# Patient Record
Sex: MALE | Race: WHITE | NOT HISPANIC OR LATINO | Employment: OTHER | URBAN - METROPOLITAN AREA
[De-identification: names, ages, dates, MRNs, and addresses within clinical notes are randomized per-mention and may not be internally consistent; named-entity substitution may affect disease eponyms.]

---

## 2017-07-24 ENCOUNTER — CLINICAL SUPPORT (OUTPATIENT)
Dept: OCCUPATIONAL MEDICINE | Facility: CLINIC | Age: 67
End: 2017-07-24

## 2017-07-24 DIAGNOSIS — Z02.83 ENCOUNTER FOR DRUG SCREENING: Primary | ICD-10-CM

## 2017-07-24 PROCEDURE — 80305 DRUG TEST PRSMV DIR OPT OBS: CPT | Mod: S$GLB,,, | Performed by: PREVENTIVE MEDICINE

## 2017-08-01 ENCOUNTER — CLINICAL SUPPORT (OUTPATIENT)
Dept: OCCUPATIONAL MEDICINE | Facility: CLINIC | Age: 67
End: 2017-08-01

## 2017-08-01 DIAGNOSIS — Z02.83 ENCOUNTER FOR DRUG SCREENING: Primary | ICD-10-CM

## 2017-08-01 PROCEDURE — 80305 DRUG TEST PRSMV DIR OPT OBS: CPT | Mod: S$GLB,,, | Performed by: INTERNAL MEDICINE

## 2019-12-22 ENCOUNTER — OFFICE VISIT (OUTPATIENT)
Dept: URGENT CARE | Facility: CLINIC | Age: 69
End: 2019-12-22
Payer: MEDICARE

## 2019-12-22 VITALS
RESPIRATION RATE: 18 BRPM | HEART RATE: 87 BPM | WEIGHT: 199 LBS | OXYGEN SATURATION: 98 % | BODY MASS INDEX: 30.16 KG/M2 | TEMPERATURE: 100 F | DIASTOLIC BLOOD PRESSURE: 98 MMHG | HEIGHT: 68 IN | SYSTOLIC BLOOD PRESSURE: 166 MMHG

## 2019-12-22 DIAGNOSIS — J02.9 SORE THROAT: Primary | ICD-10-CM

## 2019-12-22 DIAGNOSIS — J06.9 UPPER RESPIRATORY TRACT INFECTION, UNSPECIFIED TYPE: ICD-10-CM

## 2019-12-22 LAB
CTP QC/QA: YES
FLUAV AG NPH QL: NEGATIVE
FLUBV AG NPH QL: NEGATIVE

## 2019-12-22 PROCEDURE — 99204 OFFICE O/P NEW MOD 45 MIN: CPT | Mod: S$GLB,,, | Performed by: NURSE PRACTITIONER

## 2019-12-22 PROCEDURE — 99204 PR OFFICE/OUTPT VISIT, NEW, LEVL IV, 45-59 MIN: ICD-10-PCS | Mod: S$GLB,,, | Performed by: NURSE PRACTITIONER

## 2019-12-22 PROCEDURE — 87804 INFLUENZA ASSAY W/OPTIC: CPT | Mod: QW,S$GLB,, | Performed by: NURSE PRACTITIONER

## 2019-12-22 PROCEDURE — 87804 POCT INFLUENZA A/B: ICD-10-PCS | Mod: QW,S$GLB,, | Performed by: NURSE PRACTITIONER

## 2019-12-22 RX ORDER — PROMETHAZINE HYDROCHLORIDE AND DEXTROMETHORPHAN HYDROBROMIDE 6.25; 15 MG/5ML; MG/5ML
SYRUP ORAL
COMMUNITY
Start: 2019-12-20

## 2019-12-22 NOTE — PATIENT INSTRUCTIONS
"                                                         URI   If your condition worsens or fails to improve we recommend that you receive another evaluation at the ER immediately or contact your PCP to discuss your concerns or return here. You must understand that you've received an urgent care treatment only and that you may be released before all your medical problems are known or treated. You the patient will arrange for follouwp care as instructed.   If we discussed that I think your illness is viral, it will not respond to antibiotics and will last 5-7 days. If we discussed "wait and see" antibiotics and if over the next few days the symptoms worsen start the antibiotics I have given you.   -  If you are female and on BCP and do take the antibiotics, use additional methods to prevent pregnancy while on the antibiotics and for one cycle after.   -  Flonase (fluticasone) is a nasal spray which is available over the counter and may help with your symptoms.   -  Zyrtec D, Claritin D or Allegra D can also help with symptoms of congestion and drainage.   -  If you have hypertension avoid using the "D" which is the decongestant.  Instead you can use Coricidin HBP for cold and cough symptoms.    -  If you just have drainage you can take plain Zyrtec, Claritin or Allegra   -  If you just have a congested feeling you can take pseudoephedrine (unless you have high blood pressure) which you have to sign for behind the counter. Do not buy the phenylephrine which is on the shelf as it is not effective   -  Rest and fluids are also important.   -  Tylenol or ibuprofen can also be used as directed for pain unless you have an allergy to them or medical condition such as stomach ulcers, kidney or liver disease or blood thinners etc for which you should not be taking these type of medications.   -  If you are flying in the next few days Afrin nose drops for the airplane flight upon take off and landing may help. Other than at " those times refrain from using afrin.   - If you were prescribed a narcotic do not drive or operate heavy machinery while taking these medications.

## 2024-03-25 ENCOUNTER — HOSPITAL ENCOUNTER (EMERGENCY)
Facility: HOSPITAL | Age: 74
Discharge: LEFT BEFORE DISCHARGE | End: 2024-03-25
Attending: EMERGENCY MEDICINE
Payer: MEDICARE

## 2024-03-25 VITALS
HEART RATE: 58 BPM | BODY MASS INDEX: 27.28 KG/M2 | WEIGHT: 180 LBS | RESPIRATION RATE: 20 BRPM | HEIGHT: 68 IN | TEMPERATURE: 98 F | DIASTOLIC BLOOD PRESSURE: 90 MMHG | SYSTOLIC BLOOD PRESSURE: 195 MMHG | OXYGEN SATURATION: 96 %

## 2024-03-25 DIAGNOSIS — G45.9 TIA (TRANSIENT ISCHEMIC ATTACK): Primary | ICD-10-CM

## 2024-03-25 DIAGNOSIS — I10 HYPERTENSION, UNSPECIFIED TYPE: ICD-10-CM

## 2024-03-25 LAB
ALBUMIN SERPL BCP-MCNC: 4.3 G/DL (ref 3.5–5.2)
ALP SERPL-CCNC: 52 U/L (ref 55–135)
ALT SERPL W/O P-5'-P-CCNC: 22 U/L (ref 10–44)
ANION GAP SERPL CALC-SCNC: 6 MMOL/L (ref 8–16)
AST SERPL-CCNC: 25 U/L (ref 10–40)
BASOPHILS # BLD AUTO: 0.02 K/UL (ref 0–0.2)
BASOPHILS NFR BLD: 0.3 % (ref 0–1.9)
BILIRUB SERPL-MCNC: 0.6 MG/DL (ref 0.1–1)
BUN SERPL-MCNC: 7 MG/DL (ref 8–23)
CALCIUM SERPL-MCNC: 10.1 MG/DL (ref 8.7–10.5)
CHLORIDE SERPL-SCNC: 106 MMOL/L (ref 95–110)
CO2 SERPL-SCNC: 26 MMOL/L (ref 23–29)
CREAT SERPL-MCNC: 0.9 MG/DL (ref 0.5–1.4)
DIFFERENTIAL METHOD BLD: ABNORMAL
EOSINOPHIL # BLD AUTO: 0.2 K/UL (ref 0–0.5)
EOSINOPHIL NFR BLD: 2.9 % (ref 0–8)
ERYTHROCYTE [DISTWIDTH] IN BLOOD BY AUTOMATED COUNT: 12.8 % (ref 11.5–14.5)
EST. GFR  (NO RACE VARIABLE): >60 ML/MIN/1.73 M^2
GLUCOSE SERPL-MCNC: 94 MG/DL (ref 70–110)
HCT VFR BLD AUTO: 45.7 % (ref 40–54)
HCV AB SERPL QL IA: NORMAL
HGB BLD-MCNC: 15.8 G/DL (ref 14–18)
HIV 1+2 AB+HIV1 P24 AG SERPL QL IA: NORMAL
IMM GRANULOCYTES # BLD AUTO: 0.02 K/UL (ref 0–0.04)
IMM GRANULOCYTES NFR BLD AUTO: 0.3 % (ref 0–0.5)
LYMPHOCYTES # BLD AUTO: 1.8 K/UL (ref 1–4.8)
LYMPHOCYTES NFR BLD: 28.4 % (ref 18–48)
MAGNESIUM SERPL-MCNC: 2.1 MG/DL (ref 1.6–2.6)
MCH RBC QN AUTO: 31.6 PG (ref 27–31)
MCHC RBC AUTO-ENTMCNC: 34.6 G/DL (ref 32–36)
MCV RBC AUTO: 91 FL (ref 82–98)
MONOCYTES # BLD AUTO: 0.4 K/UL (ref 0.3–1)
MONOCYTES NFR BLD: 7 % (ref 4–15)
NEUTROPHILS # BLD AUTO: 3.8 K/UL (ref 1.8–7.7)
NEUTROPHILS NFR BLD: 61.1 % (ref 38–73)
NRBC BLD-RTO: 0 /100 WBC
OHS QRS DURATION: 74 MS
OHS QTC CALCULATION: 402 MS
PHOSPHATE SERPL-MCNC: 2.5 MG/DL (ref 2.7–4.5)
PLATELET # BLD AUTO: 196 K/UL (ref 150–450)
PMV BLD AUTO: 10.8 FL (ref 9.2–12.9)
POTASSIUM SERPL-SCNC: 4 MMOL/L (ref 3.5–5.1)
PROT SERPL-MCNC: 7.5 G/DL (ref 6–8.4)
RBC # BLD AUTO: 5 M/UL (ref 4.6–6.2)
SODIUM SERPL-SCNC: 138 MMOL/L (ref 136–145)
WBC # BLD AUTO: 6.17 K/UL (ref 3.9–12.7)

## 2024-03-25 PROCEDURE — 86803 HEPATITIS C AB TEST: CPT | Performed by: PHYSICIAN ASSISTANT

## 2024-03-25 PROCEDURE — 93005 ELECTROCARDIOGRAM TRACING: CPT

## 2024-03-25 PROCEDURE — 84100 ASSAY OF PHOSPHORUS: CPT | Performed by: EMERGENCY MEDICINE

## 2024-03-25 PROCEDURE — 85025 COMPLETE CBC W/AUTO DIFF WBC: CPT | Performed by: STUDENT IN AN ORGANIZED HEALTH CARE EDUCATION/TRAINING PROGRAM

## 2024-03-25 PROCEDURE — 87389 HIV-1 AG W/HIV-1&-2 AB AG IA: CPT | Performed by: PHYSICIAN ASSISTANT

## 2024-03-25 PROCEDURE — 83735 ASSAY OF MAGNESIUM: CPT | Performed by: EMERGENCY MEDICINE

## 2024-03-25 PROCEDURE — 99284 EMERGENCY DEPT VISIT MOD MDM: CPT | Mod: 25

## 2024-03-25 PROCEDURE — 80053 COMPREHEN METABOLIC PANEL: CPT | Performed by: STUDENT IN AN ORGANIZED HEALTH CARE EDUCATION/TRAINING PROGRAM

## 2024-03-25 PROCEDURE — 93010 ELECTROCARDIOGRAM REPORT: CPT | Mod: ,,, | Performed by: INTERNAL MEDICINE

## 2024-03-25 RX ORDER — AMLODIPINE BESYLATE 5 MG/1
5 TABLET ORAL DAILY
Qty: 30 TABLET | Refills: 11 | Status: SHIPPED | OUTPATIENT
Start: 2024-03-25 | End: 2024-04-11

## 2024-03-25 RX ORDER — AMLODIPINE BESYLATE 5 MG/1
5 TABLET ORAL DAILY
Qty: 30 TABLET | Refills: 11 | OUTPATIENT
Start: 2024-03-25 | End: 2024-03-25

## 2024-03-25 RX ORDER — AMLODIPINE BESYLATE 5 MG/1
5 TABLET ORAL DAILY
Qty: 30 TABLET | Refills: 0 | OUTPATIENT
Start: 2024-03-25 | End: 2024-03-25

## 2024-03-25 RX ORDER — DORZOLAMIDE HYDROCHLORIDE AND TIMOLOL MALEATE 20; 5 MG/ML; MG/ML
1 SOLUTION/ DROPS OPHTHALMIC 2 TIMES DAILY
COMMUNITY

## 2024-03-25 NOTE — PROVIDER PROGRESS NOTES - EMERGENCY DEPT.
Encounter Date: 3/25/2024    ED Physician Progress Notes        Signout Note  I received signout from the previous providers.     Chief complaint:  Multiple complaints (Last wed r leg and r arm reji numb lasted 4-5 min, resolved, told by retina spec to come to er to r/o stroke,  flashing light to r eye,  hx detatched retina )      Per sign out and chart review: Manolo Mcnally is a 73 y.o. male with hx of remote bilateral retinal detachments presenting to ED at recommendation of retina specialist due to transient symptoms of poor proprioception on 3/20. Patient also examined by specialist this morning and was cleared from that perspective. Due to TIA, basic labs and MRI brain ordered. Patient also found to be bradycardia and hypertensive.     Pt signed out to me pending: MRI completion with anticipation for discharge home.     Update/ Disposition: patient declined to wait for the MRI to be performed. Patient was educated on the risks of leaving AMA without completion of workup.  Patient displayed good understanding and capacity and still opting to leave against medical advice.  Patient was instructed to follow up with vascular neurology for formal study with MRI.  Patient agreed to follow up and was given strict return precautions.  Vital signs were stable on re-evaluation.    Patient, caregiver and/or family understands the plan and verbalized agreement. All questions answered.     Diagnostic Impression:    1. TIA (transient ischemic attack)

## 2024-03-25 NOTE — DISCHARGE INSTRUCTIONS
Please follow up with your PCP shortly after discharge.    If symptoms return or worsen, or you develop difficulty walking/talking, numbness, weakness, tingling, chest pain, shortness of breath, or changes in vision, please return to the emergency department. V please also follow up with vascular neurology further management.  Referral has been placed you can reach them at: [phone]        [id number]

## 2024-03-25 NOTE — ED NOTES
..Patient identifiers for Manolo Mcnally 73 y.o. male checked and correct.  Chief Complaint   Patient presents with    Multiple complaints     Last wed r leg and r arm reji numb lasted 4-5 min, resolved, told by retina spec to come to er to r/o stroke,  flashing light to r eye,  hx detatched retina      No past medical history on file.  Allergies reported:   Review of patient's allergies indicates:   Allergen Reactions    Pred forte [prednisolone acetate]          LOC: Patient is awake, alert, and aware of environment with an appropriate affect. Patient is oriented x 3 and speaking appropriately.  APPEARANCE: Patient resting comfortably and in no acute distress. Patient is clean and well groomed, patient's clothing is properly fastened.  HEENT: **AAO--Seen by eye doctor today and told today by eye doctor to come to ED . On 3/2024 while walking around the house experienced numbness in right arm and leg that lasted less than 5 mins. And subsided  SKIN: The skin is warm and dry. Patient has normal skin turgor and moist mucus membranes. Skin is intact; no bruising or breakdown noted.  MUSKULOSKELETAL: Patient is moving all extremities well, no obvious deformities noted. Pulses intact.   RESPIRATORY: Airway is open and patent. Respirations are spontaneous and non-labored with normal effort and rate, BBS=clear  CARDIAC: Patient has a normal rate and rhythm. Sinus diane  on cardiac monitor,No peripheral edema noted. Denies any chest pain  ABDOMEN: No distention noted. Bowel sounds active in all 4 quadrants. Soft and non-tender upon palpation.  NEUROLOGICAL: . Facial expression is symmetrical. Hand grasps are equal bilaterally. Normal sensation in all extremities when touched with finger.

## 2024-03-25 NOTE — ED PROVIDER NOTES
Encounter Date: 3/25/2024       History     Chief Complaint   Patient presents with    Multiple complaints     Last wed r leg and r arm reji numb lasted 4-5 min, resolved, told by retina spec to come to er to r/o stroke,  flashing light to r eye,  hx detatched retina      HPI    Patient is a 74yo male with hx of remote bilateral retinal detachments presenting to ED at recommendation of retina specialist due to transient symptoms of poor proprioception on 3/20. Patient states that at that time, he felt as if his R hip was not working well with his R foot. During that same period of time, he had some challenges with proprioception with his R hand, having to concentrate more to grab a glass. Symptoms resolved after 4 minutes.    Also noting flashes in his R eye, similar in appearance to when he had a retinal detachment several years ago, mainly at night since the episode. He was evaluated in the retina clinic today prior to being sent over.     Review of patient's allergies indicates:   Allergen Reactions    Pred forte [prednisolone acetate]      No past medical history on file.  No past surgical history on file.  No family history on file.  Social History     Tobacco Use    Smoking status: Never     Physical Exam     Initial Vitals [03/25/24 1230]   BP Pulse Resp Temp SpO2   (!) 162/90 67 18 97.5 °F (36.4 °C) 96 %      MAP       --         Physical Exam    Constitutional: He appears well-developed and well-nourished. He is not diaphoretic. No distress.   HENT:   Head: Normocephalic and atraumatic.   Eyes: EOM are normal.   Pupils enlarged bilaterally   Cardiovascular:  Regular rhythm.           bradycardia   Pulmonary/Chest: Breath sounds normal. No respiratory distress.     Neurological: He is alert. He has normal strength. No cranial nerve deficit or sensory deficit.   Skin: Skin is warm and dry.   Psychiatric: He has a normal mood and affect. Thought content normal.         ED Course   Procedures  Labs Reviewed    CBC W/ AUTO DIFFERENTIAL - Abnormal; Notable for the following components:       Result Value    MCH 31.6 (*)     All other components within normal limits    Narrative:     Release to patient->Immediate   COMPREHENSIVE METABOLIC PANEL - Abnormal; Notable for the following components:    BUN 7 (*)     Alkaline Phosphatase 52 (*)     Anion Gap 6 (*)     All other components within normal limits    Narrative:     Release to patient->Immediate   PHOSPHORUS - Abnormal; Notable for the following components:    Phosphorus 2.5 (*)     All other components within normal limits   HIV 1 / 2 ANTIBODY    Narrative:     Release to patient->Immediate   HEPATITIS C ANTIBODY    Narrative:     Release to patient->Immediate   MAGNESIUM     EKG Readings: (Independently Interpreted)   Initial Reading: No STEMI. Rhythm: Sinus Bradycardia. Heart Rate: 50. Ectopy: No Ectopy. Conduction: Normal. T Waves Flipped: AVR. Axis: Normal. Clinical Impression: Sinus Bradycardia     ECG Results              EKG 12-lead (Final result)        Collection Time Result Time QRS Duration OHS QTC Calculation    03/25/24 13:41:41 03/25/24 13:46:21 74 402                     Final result by Interface, Lab In [facility] (03/25/24 13:46:30)                   Narrative:    Test Reason : R20.0,R20.2,    Vent. Rate : 050 BPM     Atrial Rate : 050 BPM     P-R Int : 156 ms          QRS Dur : 074 ms      QT Int : 442 ms       P-R-T Axes : 037 051 024 degrees     QTc Int : 402 ms    Sinus bradycardia  Otherwise normal ECG  No previous ECGs available  Confirmed by Michelle Asencio MD ([id number]) on 3/25/2024 1:46:17 PM    Referred By: [email or web address]   SELF           Confirmed By:Michelle Asencio MD                                  Imaging Results    None          Medications - No data to display  Medical Decision Making  Amount and/or Complexity of Data Reviewed  Labs: ordered.  Radiology: ordered.  ECG/medicine tests: ordered and independent interpretation  performed.    Risk  Decision regarding hospitalization.    Patient is a 74yo male with hx of remote bilateral retinal detachments presenting to ED at recommendation of retina specialist due to transient symptoms of poor proprioception on 3/20. Patient also examined by specialist this morning and was cleared from that perspective. Due to TIA, basic labs and MRI brain ordered. Patient also found to be bradycardia and hypertensive. Labs relatively unremarkable. He was signed out to oncoming team pending MRI completion with anticipation for discharge home.           Attending Attestation:   Physician Attestation Statement for Resident:  As the supervising MD   Physician Attestation Statement: I have personally seen and examined this patient.   I agree with the above history.  -:  Right-sided weakness that has resolved last week   As the supervising MD I agree with the above PE.     As the supervising MD I agree with the above treatment, course, plan, and disposition.   -:  Patient signed out to Dr. Michael  awaiting MRI.  Patient refused MRI and signed out against medical advice                                          Clinical Impression:  Final diagnoses:  [G45.9] TIA (transient ischemic attack) (Primary)  [I10] Hypertension, unspecified type          ED Disposition Condition    AMA Stable                Valentina Fernandez,   Resident  03/25/24 4205       Marquez Ortega III, MD  03/26/24 1182

## 2024-03-25 NOTE — FIRST PROVIDER EVALUATION
"Medical screening exam completed.  I have conducted a focused provider triage encounter, findings are as follows:    Brief history of present illness: Here for numbness that has now resolved. He reports that last Wednesday he developed numbness in the right leg and right arm that lasted for 5 minutes before spontaneously resolving. Also reports he has hx of bilateral retinal detachments and has been having flashes of light in his right peripheral vision that has been ongoing for several months. He saw his retinal specialist today who told him to come to the ED for the numbness he experienced a few days ago. He has not had any recurrent numbness. He reports his vision is normal at this time.     Vitals:    03/25/24 1230   BP: (!) 162/90   Pulse: 67   Resp: 18   Temp: 97.5 °F (36.4 °C)   TempSrc: Oral   SpO2: 96%   Weight: 81.6 kg (180 lb)   Height: 5' 8" (1.727 m)       Pertinent physical exam:    Neurological: Awake and alert, 5/5 motor in all extremities, SILT in all extremities, CN II-VII grossly intact  Constitutional: No acute distress  Respiratory: Non-labored  Cardiovascular: Well perfused     Brief workup plan:  Labs, EKG    Preliminary workup initiated; this workup will be continued and followed by the physician or advanced practice provider that is assigned to the patient when roomed.   "

## 2024-03-26 ENCOUNTER — TELEPHONE (OUTPATIENT)
Dept: INTERNAL MEDICINE | Facility: CLINIC | Age: 74
End: 2024-03-26
Payer: MEDICARE

## 2024-03-26 NOTE — TELEPHONE ENCOUNTER
----- Message from Baileydread Anne sent at 3/26/2024 11:32 AM CDT -----  Regarding: P/t advice  Type: Patient Call Back    Who called: Louise    What is the request in detail: p/t wife is calling b/c she would like him to have a cholesterol panel before his appt and also if he could be seen sooner b/c he has been having problems losing control of his right hand. Please call to assist.     Can the clinic reply by [facility]?    Would the patient rather a call back or a response via [facility]? Callback     Best call back number: [phone]    Additional Information:

## 2024-04-01 NOTE — TELEPHONE ENCOUNTER
Called pt's wife to see if I can help her move up appt, and to inform her PCP will order labs once he sees him    Spoke w/ her  She said pt ended up going to ED for possible TIA and they checked on everything, but pt declined MRI bc they had to wait too long  ED placed ref for neurology and she wasn't sure if she wanted to schedule that or wait until she saw the PCP and wanted to know my opinion    I let her know that I don't see anything sooner for Dr Vasquez but that I will check with him to make sure  I also said that since the ED evaluated him and they placed the referral, it's good to go ahead and call and schedule that appt    I told her I'll call her back once I hear from Dr Vasquez about sooner appt w/ him  They verbalized understanding and had no further concerns or questions.

## 2024-04-01 NOTE — TELEPHONE ENCOUNTER
Foreign Rose MD  You; [facility] Foreign Rose Staff5 days ago       Yes.  I can see him earlier if needed  We will order labs at that time

## 2024-04-02 NOTE — TELEPHONE ENCOUNTER
Spoke to Ms. Mcnally she was told to go ahead and schedule with Neirology per the recommendations of the ER. They will keep the appt with Dr. Vasquez on 4/11/24, she was informed Dr. Vasquez will order additional labs at the appt if he feels it's necessary to do so and her  do not need to fast for the appt at 1pm. She agreed and understood instructions

## 2024-04-11 ENCOUNTER — OFFICE VISIT (OUTPATIENT)
Dept: INTERNAL MEDICINE | Facility: CLINIC | Age: 74
End: 2024-04-11
Payer: MEDICARE

## 2024-04-11 ENCOUNTER — LAB VISIT (OUTPATIENT)
Dept: LAB | Facility: OTHER | Age: 74
End: 2024-04-11
Attending: STUDENT IN AN ORGANIZED HEALTH CARE EDUCATION/TRAINING PROGRAM
Payer: MEDICARE

## 2024-04-11 VITALS
HEIGHT: 68 IN | SYSTOLIC BLOOD PRESSURE: 136 MMHG | HEART RATE: 76 BPM | WEIGHT: 200.81 LBS | OXYGEN SATURATION: 93 % | DIASTOLIC BLOOD PRESSURE: 81 MMHG | BODY MASS INDEX: 30.44 KG/M2

## 2024-04-11 DIAGNOSIS — Z23 NEED FOR PROPHYLACTIC VACCINATION AGAINST STREPTOCOCCUS PNEUMONIAE (PNEUMOCOCCUS): ICD-10-CM

## 2024-04-11 DIAGNOSIS — R79.9 BLOOD CHEMISTRY ABNORMALITY: ICD-10-CM

## 2024-04-11 DIAGNOSIS — G72.0 STATIN MYOPATHY: Primary | ICD-10-CM

## 2024-04-11 DIAGNOSIS — T46.6X5A STATIN MYOPATHY: Primary | ICD-10-CM

## 2024-04-11 DIAGNOSIS — G72.0 STATIN MYOPATHY: ICD-10-CM

## 2024-04-11 DIAGNOSIS — E66.3 OVERWEIGHT: ICD-10-CM

## 2024-04-11 DIAGNOSIS — R73.03 PREDIABETES: ICD-10-CM

## 2024-04-11 DIAGNOSIS — T46.6X5A STATIN MYOPATHY: ICD-10-CM

## 2024-04-11 DIAGNOSIS — Z23 NEEDS FLU SHOT: ICD-10-CM

## 2024-04-11 PROBLEM — R73.02 IMPAIRED GLUCOSE TOLERANCE: Status: ACTIVE | Noted: 2023-01-03

## 2024-04-11 PROBLEM — F10.11 HISTORY OF ETOH ABUSE: Status: ACTIVE | Noted: 2021-01-13

## 2024-04-11 LAB
CHOLEST SERPL-MCNC: 205 MG/DL (ref 120–199)
CHOLEST/HDLC SERPL: 4.4 {RATIO} (ref 2–5)
ESTIMATED AVG GLUCOSE: 117 MG/DL (ref 68–131)
HBA1C MFR BLD: 5.7 % (ref 4–5.6)
HDLC SERPL-MCNC: 47 MG/DL (ref 40–75)
HDLC SERPL: 22.9 % (ref 20–50)
LDLC SERPL CALC-MCNC: 128 MG/DL (ref 63–159)
NONHDLC SERPL-MCNC: 158 MG/DL
TRIGL SERPL-MCNC: 150 MG/DL (ref 30–150)

## 2024-04-11 PROCEDURE — 80061 LIPID PANEL: CPT | Performed by: STUDENT IN AN ORGANIZED HEALTH CARE EDUCATION/TRAINING PROGRAM

## 2024-04-11 PROCEDURE — 99213 OFFICE O/P EST LOW 20 MIN: CPT | Mod: PBBFAC,25 | Performed by: STUDENT IN AN ORGANIZED HEALTH CARE EDUCATION/TRAINING PROGRAM

## 2024-04-11 PROCEDURE — 99999 PR PBB SHADOW E&M-EST. PATIENT-LVL III: CPT | Mod: PBBFAC,,, | Performed by: STUDENT IN AN ORGANIZED HEALTH CARE EDUCATION/TRAINING PROGRAM

## 2024-04-11 PROCEDURE — 99214 OFFICE O/P EST MOD 30 MIN: CPT | Mod: S$PBB,,, | Performed by: STUDENT IN AN ORGANIZED HEALTH CARE EDUCATION/TRAINING PROGRAM

## 2024-04-11 PROCEDURE — 90677 PCV20 VACCINE IM: CPT | Mod: PBBFAC

## 2024-04-11 PROCEDURE — 36415 COLL VENOUS BLD VENIPUNCTURE: CPT | Performed by: STUDENT IN AN ORGANIZED HEALTH CARE EDUCATION/TRAINING PROGRAM

## 2024-04-11 PROCEDURE — 99999PBSHW PNEUMOCOCCAL CONJUGATE VACCINE 20-VALENT: Mod: PBBFAC,,,

## 2024-04-11 PROCEDURE — 83036 HEMOGLOBIN GLYCOSYLATED A1C: CPT | Performed by: STUDENT IN AN ORGANIZED HEALTH CARE EDUCATION/TRAINING PROGRAM

## 2024-04-11 RX ORDER — TRAZODONE HYDROCHLORIDE 50 MG/1
50-100 TABLET ORAL NIGHTLY PRN
COMMUNITY
Start: 2024-01-02

## 2024-04-11 RX ORDER — CHLORDIAZEPOXIDE HYDROCHLORIDE 25 MG/1
CAPSULE, GELATIN COATED ORAL
COMMUNITY
Start: 2023-12-25 | End: 2024-04-11 | Stop reason: ALTCHOICE

## 2024-04-11 RX ORDER — EZETIMIBE 10 MG/1
10 TABLET ORAL
COMMUNITY
Start: 2024-04-06

## 2024-04-11 RX ORDER — IBUPROFEN 200 MG
TABLET ORAL
COMMUNITY

## 2024-04-11 RX ORDER — DORZOLAMIDE HYDROCHLORIDE AND TIMOLOL MALEATE PRESERVATIVE FREE 20; 5 MG/ML; MG/ML
SOLUTION/ DROPS OPHTHALMIC
COMMUNITY

## 2024-04-11 RX ORDER — METFORMIN HYDROCHLORIDE 500 MG/1
500 TABLET ORAL
COMMUNITY
Start: 2024-03-12 | End: 2024-09-08

## 2024-04-11 RX ORDER — ONDANSETRON 4 MG/1
4 TABLET, ORALLY DISINTEGRATING ORAL EVERY 6 HOURS PRN
COMMUNITY
Start: 2023-12-25 | End: 2024-04-11 | Stop reason: ALTCHOICE

## 2024-04-11 NOTE — PROGRESS NOTES
After obtaining consent, and per orders of Dr. Vasquez, injection of prevnar 20 given in left deltoid.  Lot # [id number].  Given by Shaneka Negron. Patient instructed to remain in clinic for 20 minutes afterwards, and to report any adverse reaction to me immediately.

## 2024-04-11 NOTE — PROGRESS NOTES
[facility]  Subjective:       Patient ID: Manolo Mcnally is a 73 y.o. male.  Chief Complaint: establish care.TIA  History was obtained from the patient and supplemented through chart review.    HPI:  Patient is a 73 y.o. male who presents for the above chief complaint.   A few weeks ago when moving around at home he felt like his right hip didn't want to move properly. Went to the kitchen and reached for a glass but couldn't quite get it.  This was a few minutes after he had just moved around some heavy furniture.  He mentioned this to his retina specialist (Jamison Corey) who advised he go to the ER. He was seen in the ER for this but did not stay for the MRI due to wait time. Has not had a recurrence of symptoms since.    Medical history is pertininat for retinal detachment s/p surgery x2.   He has alcohol use disorder in remission. He quit drinking July 18th 2015. Has had a couple relapses since but has not drank for months.     Had HTN while he was drinking but is no longer on antihypertensives.   He is on timolol for glaucoma.   Last physical was at a clinic in [facility]. Was put on metformin for weight issues. Is taking 1g Metformin BID, is taking Zetia for HLD. Is intolerant to statins due to muscle cramps. Takes ibuprofen every night at bedtime for aches and pains and a daily multivitmain.     Is originally from [place] but spent most of his Adult life in [facility]. Was previously driving up to [place] for all of his care but is in the process of transitioning it to [place] following his move back here.    Is a Retired pharmacist.  Lives with wife, has one daughter who is 44.   Quit smoking in October 1973.   Exercise: was previosly a , now goes to the gym 2-3 times per week and does cardio and light weights.    Father had carcinoma of the illeum in 2008. He passed away in 2017 at age 86 from complications due to alcohol use..   Mother passed away from  "lung cancer associated with smoking.     Had last colonoscopy about 6 months ago in [facility]. Has had 4 colonoscopies total.     Had IV immunoglobulin infusions in the past at the recommendation of his allergist.  States this essentially cured his allergic rhinitis.     Medical History  History reviewed. No pertinent past medical history.      Surgical hx, family hx, social hx   Family History   Problem Relation Age of Onset    Colon cancer Father      Past Surgical History:   Procedure Laterality Date    EYE SURGERY       Social History     Socioeconomic History    Marital status:    Tobacco Use    Smoking status: Never     Immunization History   Administered Date(s) Administered    COVID-19, MRNA, LN-S, PF (MODERNA FULL 0.5 ML DOSE) 03/20/2021, 04/17/2021    Hepatitis B, Adult 10/06/2005    Influenza 10/29/2013, 10/29/2013, 12/04/2014, 12/04/2014, 10/20/2015, 01/10/2017    Influenza - Quadrivalent - PF *Preferred* (6 months and older) 10/29/2013    Influenza - Trivalent (ADULT) 12/04/2014    Influenza - Trivalent - PF (ADULT) 10/20/2015, 01/10/2017    Pneumococcal Conjugate - 20 Valent 04/11/2024    Pneumococcal Polysaccharide - 23 Valent 02/07/2020    Tdap 12/04/2014     Current Outpatient Medications   Medication Instructions    dorzolamide-timolol 2-0.5% (COSOPT) 22.3-6.8 mg/mL ophthalmic solution 1 drop, 2 times daily    dorzolamide-timolol, PF, (COSOPT PF) 2-0.5 % Dpet ophthalmic solution Dorzolamide-Timolol    ezetimibe (ZETIA) 10 mg, Oral    ibuprofen (IBUPROFEN IB) 200 MG tablet Ibuprofen    LORazepam (ATIVAN) 1 mg, Oral, Every 8 hours PRN    metFORMIN (GLUCOPHAGE) 500 mg, Oral    tetrahydrozoline 0.05% (VISION CLEAR) 0.05 % Drop 1 drop, 3 times daily    traZODone (DESYREL)  mg, Oral, Nightly PRN     Objective:      Body mass index is 30.54 kg/m².  Vitals:    04/11/24 1318   BP: 136/81   Pulse: 76   SpO2: (!) 93%   Weight: 91.1 kg (200 lb 13.4 oz)   Height: 5' 8" " (1.727 m)   PainSc: 0-No pain     Physical Exam  Constitutional:       General: He is not in acute distress.     Appearance: Normal appearance. He is not ill-appearing.   HENT:      Mouth/Throat:      Mouth: Mucous membranes are moist.   Eyes:      Pupils: Pupils are unequal.   Neck:      Vascular: No carotid bruit.   Cardiovascular:      Rate and Rhythm: Normal rate.      Heart sounds: No murmur heard.  Pulmonary:      Effort: Pulmonary effort is normal. No respiratory distress.   Abdominal:      General: Abdomen is flat.   Musculoskeletal:      Cervical back: No rigidity.      Right lower leg: No edema.      Left lower leg: No edema.   Lymphadenopathy:      Cervical: No cervical adenopathy.   Neurological:      Mental Status: He is alert.   Psychiatric:         Mood and Affect: Mood normal.           Lab Results   Component Value Date    WBC 6.17 03/25/2024    HGB 15.8 03/25/2024    HCT 45.7 03/25/2024     03/25/2024    CHOL 205 (H) 04/11/2024    TRIG 150 04/11/2024    HDL 47 04/11/2024    ALT 22 03/25/2024    AST 25 03/25/2024     03/25/2024    K 4.0 03/25/2024     03/25/2024    CREATININE 0.9 03/25/2024    BUN 7 (L) 03/25/2024    CO2 26 03/25/2024    PSA 1.16 08/02/2022    INR 0.9 11/25/2020    HGBA1C 5.7 (H) 04/11/2024       The 10-year ASCVD risk score (Dana DK, et al., 2019) is: 22%    Values used to calculate the score:      Age: 73 years      Sex: Male      Is Non- : No      Diabetic: No      Tobacco smoker: No      Systolic Blood Pressure: 136 mmHg      Is BP treated: No      HDL Cholesterol: 47 mg/dL      Total Cholesterol: 205 mg/dL  Assessment:       1. Statin myopathy    2. Need for prophylactic vaccination against Streptococcus pneumoniae (pneumococcus)    3. Needs flu shot    4. Overweight    5. Blood chemistry abnormality    6. Prediabetes          Plan:     Seen today to establish care.  He was doing well.  We discussed his recent episode which may have  been a TIA.  Discussed that it would be appropriate to work this up further with MRI brain and MRA neck;  However since he is intolerant to statins, unless this shows hemodynamically significant carotid disease, the only way it would  is that he would need to add aspirin daily from now on since he was intolerant to statins  If it does show significant stenosis he would benefit from CEA or endovascular intervention.  This is less likely given unremarkable CTA head and neck done in 2020 however it is not impossible especially considering he has a history of very elevated LDL therefore I did recommend imaging.  At this point patient decided to defer MRI.  He was okay adding a daily baby aspirin to his regimen.  He has not had any further symptoms since this one episode.     Repeat lipid panel showed significant improvement in LDL.  Repeat A1c showed prediabetes is controlled with metformin.    Give PCV 20 and flu shot today.  Advised Shingrix and RSV vaccinations.  We need records of his colonoscopies.  Otherwise blood pressure looked good.  Recent labs including CBC and CMP were unremarkable.    Follow up in 3-6 months or sooner p.r.n.    Statin myopathy  -     LIPID PANEL; Future; Expected date: 04/11/2024    Need for prophylactic vaccination against Streptococcus pneumoniae (pneumococcus)  -     (In Office Administered) Pneumococcal Conjugate Vaccine (20 Valent) (IM) (Preferred)    Needs flu shot  -     (In Office Administered) Pneumococcal Conjugate Vaccine (20 Valent) (IM) (Preferred)    Overweight  -     LIPID PANEL; Future; Expected date: 04/11/2024  -     Hemoglobin A1C; Future; Expected date: 04/11/2024    Blood chemistry abnormality  -     Hemoglobin A1C; Future; Expected date: 04/11/2024    Prediabetes        Health Maintenance    All of your core healthy metrics are met.    No follow-ups on file. or sooner prn              Foreign Vasquez  [facility]  [address] [facility]  [address]  [place] [address]  Phone [phone]  Fax [phone]    This note is dictated using the M*Modal Fluency Direct word recognition program. It may contain word recognition mistakes or wrong word substitutions that were missed on review.

## 2024-12-15 ENCOUNTER — OFFICE VISIT (OUTPATIENT)
Dept: URGENT CARE | Facility: CLINIC | Age: 74
End: 2024-12-15
Payer: MEDICARE

## 2024-12-15 VITALS
HEART RATE: 64 BPM | SYSTOLIC BLOOD PRESSURE: 126 MMHG | RESPIRATION RATE: 14 BRPM | OXYGEN SATURATION: 95 % | WEIGHT: 185 LBS | BODY MASS INDEX: 28.04 KG/M2 | DIASTOLIC BLOOD PRESSURE: 74 MMHG | HEIGHT: 68 IN | TEMPERATURE: 98 F

## 2024-12-15 DIAGNOSIS — J06.9 VIRAL UPPER RESPIRATORY TRACT INFECTION WITH COUGH: Primary | ICD-10-CM

## 2024-12-15 PROCEDURE — 99213 OFFICE O/P EST LOW 20 MIN: CPT | Mod: S$GLB,,, | Performed by: NURSE PRACTITIONER

## 2024-12-15 RX ORDER — AZELASTINE 1 MG/ML
1 SPRAY, METERED NASAL 2 TIMES DAILY PRN
Qty: 30 ML | Refills: 0 | Status: SHIPPED | OUTPATIENT
Start: 2024-12-15

## 2024-12-15 RX ORDER — BENZONATATE 200 MG/1
200 CAPSULE ORAL 3 TIMES DAILY PRN
Qty: 30 CAPSULE | Refills: 0 | Status: SHIPPED | OUTPATIENT
Start: 2024-12-15

## 2024-12-15 NOTE — PROGRESS NOTES
"Subjective:      Patient ID: Manolo Mcnally is a 74 y.o. male.    Vitals:  height is 5' 8" (1.727 m) and weight is 83.9 kg (185 lb). His tympanic temperature is 98 °F (36.7 °C). His blood pressure is 126/74 and his pulse is 56 (abnormal). His respiration is 14 and oxygen saturation is 95%.     Chief Complaint: Sinus Problem    74 y.o male c/o sinus congestion. Patient states that she has sinus pressure and runny eyes. Patient states that he took night quil   Provider note below:  This is a 74 y.o. male who presents today with a chief complaint of nasal congestion, cough started 2 days ago, patient reports he took NyQuil last night, patient reports watery eyes also, denies fever, body aches or chills, denies wheezing or shortness of breath, denies nausea, vomiting, diarrhea or abdominal pain, denies chest pain or dizziness positional lightheadedness, denies sore throat or trouble swallowing, denies loss of taste or smell, or any other symptoms  Patient declined any testing       Sinus Problem  This is a new problem. Episode onset: 3 days ago. The problem has been gradually worsening since onset. There has been no fever. His pain is at a severity of 3/10. The pain is mild. Associated symptoms include congestion (nasal), coughing, headaches and sinus pressure. Pertinent negatives include no diaphoresis, ear pain, hoarse voice, neck pain, shortness of breath, sneezing, sore throat or swollen glands. Past treatments include nothing. The treatment provided no relief.       Constitution: Negative for sweating.   HENT:  Positive for congestion (nasal) and sinus pressure. Negative for ear pain and sore throat.    Neck: Negative for neck pain.   Respiratory:  Positive for cough. Negative for shortness of breath.    Allergic/Immunologic: Negative for sneezing.   Neurological:  Positive for headaches.      Objective:     Physical Exam   Constitutional: He is oriented to person, place, and time. He appears well-developed. He is " cooperative.  Non-toxic appearance. He does not appear ill. No distress.   HENT:   Head: Normocephalic and atraumatic.   Ears:   Right Ear: Hearing, tympanic membrane, external ear and ear canal normal.   Left Ear: Hearing, tympanic membrane, external ear and ear canal normal.   Nose: Nose normal. No mucosal edema, rhinorrhea or nasal deformity. No epistaxis. Right sinus exhibits no maxillary sinus tenderness and no frontal sinus tenderness. Left sinus exhibits no maxillary sinus tenderness and no frontal sinus tenderness.   Mouth/Throat: Uvula is midline, oropharynx is clear and moist and mucous membranes are normal. No trismus in the jaw. Normal dentition. No uvula swelling. No oropharyngeal exudate, posterior oropharyngeal edema, posterior oropharyngeal erythema, tonsillar abscesses or cobblestoning.   Eyes: Conjunctivae and lids are normal. Pupils are equal, round, and reactive to light. No scleral icterus. vision grossly intact gaze aligned appropriately periorbital hyperpigmentation  Neck: Trachea normal and phonation normal. Neck supple. No edema present. No erythema present. No neck rigidity present.   Cardiovascular: Normal rate, regular rhythm, normal heart sounds and normal pulses.   Pulmonary/Chest: Effort normal and breath sounds normal. No stridor. No respiratory distress. He has no decreased breath sounds. He has no wheezes. He has no rhonchi. He has no rales.   Abdominal: Normal appearance.   Musculoskeletal: Normal range of motion.         General: No deformity. Normal range of motion.   Neurological: He is alert and oriented to person, place, and time. He exhibits normal muscle tone. Coordination normal.   Skin: Skin is warm, dry, intact, not diaphoretic and not pale.   Psychiatric: His speech is normal and behavior is normal. Judgment and thought content normal.   Nursing note and vitals reviewed.      Patient in no acute distress.  Vitals reassuring.  Discussed results/diagnosis/plan in depth  with patient in clinic. Strict precautions given to patient to monitor for worsening signs and symptoms. Advised to follow up with primary.All questions answered. Strict ER precautions given. If your symptoms worsens or fail to improve you should go to the Emergency Room. Discharge and follow-up instructions given verbally/printed. Discharge and follow-up instructions discussed with the patient who expressed understanding and willingness to comply with my recommendations.Patient voiced understanding and in agreement with current treatment plan.     Please be advised this text was dictated with [employer] software and may contain errors due to translation.     Assessment:     1. Viral upper respiratory tract infection with cough        Plan:       Viral upper respiratory tract infection with cough    Other orders  -     benzonatate (TESSALON) 200 MG capsule; Take 1 capsule (200 mg total) by mouth 3 (three) times daily as needed for Cough.  Dispense: 30 capsule; Refill: 0  -     azelastine (ASTELIN) 137 mcg (0.1 %) nasal spray; 1 spray (137 mcg total) by Nasal route 2 (two) times daily as needed for Rhinitis.  Dispense: 30 mL; Refill: 0          Medical Decision Making:   Urgent Care Management:  Patient in no acute distress.  Vitals reassuring.  On exam, patient is nontoxic appearing and afebrile.  Lungs CTA.  Patient with cough and nasal congestion started 2 days ago.  Cough medication prescribed, patient  requesting antibiotics.  I did discussed with patient in detail that his symptoms are likely secondary to viral illness as his symptoms started 2 days ago.  Re-evaluation recommended if no improvement symptoms or worsening symptoms.   Medication prescribed and over-the-counter medication discussed with patient at length.  Proper hydration advised.  I reiterated the importance of further evaluation if no improvement symptoms and follow-up with primary.          Patient Instructions   PLEASE READ YOUR DISCHARGE  INSTRUCTIONS ENTIRELY AS IT CONTAINS IMPORTANT INFORMATION.      Please drink plenty of fluids.    Please get plenty of rest.    Please return here or go to the Emergency Department for any concerns or worsening of condition.    Please take an over the counter antihistamine medication (allegra/Claritin/Zyrtec) of your choice as directed.    Try an over the counter decongestant like Mucinex D or Sudafed. You buy this behind the pharmacy counter    If you do have Hypertension or palpitations, it is safe to take Coricidin HBP for relief of sinus symptoms.    If not allergic, please take over the counter Tylenol (Acetaminophen) and/or Motrin (Ibuprofen) as directed for control of pain and/or fever.  Please follow up with your primary care doctor or specialist as needed.    Sore throat recommendations: Warm fluids, warm salt water gargles, throat lozenges, tea, honey, soup, rest, hydration.    Use over the counter flonase: one spray each nostril twice daily OR two sprays each nostril once daily.     If you  smoke, please stop smoking.      Please return or see your primary care doctor if you develop new or worsening symptoms.     Please arrange follow up with your primary medical clinic as soon as possible. You must understand that you've received an Urgent Care treatment only and that you may be released before all of your medical problems are known or treated. You, the patient, will arrange for follow up as instructed. If your symptoms worsen or fail to improve you should go to the Emergency Room.

## 2024-12-26 ENCOUNTER — TELEPHONE (OUTPATIENT)
Dept: INTERNAL MEDICINE | Facility: CLINIC | Age: 74
End: 2024-12-26
Payer: MEDICARE

## 2025-02-12 DIAGNOSIS — R73.03 PREDIABETES: Primary | ICD-10-CM

## 2025-02-12 DIAGNOSIS — E78.5 HYPERLIPIDEMIA LDL GOAL <130: ICD-10-CM

## 2025-02-12 RX ORDER — EZETIMIBE 10 MG/1
10 TABLET ORAL DAILY
Qty: 90 TABLET | Refills: 3 | Status: SHIPPED | OUTPATIENT
Start: 2025-02-12

## 2025-02-12 RX ORDER — METFORMIN HYDROCHLORIDE 500 MG/1
500 TABLET ORAL 2 TIMES DAILY WITH MEALS
Qty: 120 TABLET | Refills: 3 | Status: SHIPPED | OUTPATIENT
Start: 2025-02-12 | End: 2026-02-12

## 2025-02-12 NOTE — TELEPHONE ENCOUNTER
Patient called to get refills on his meds. His last office visit 4/11/24 upcoming appt 4/11/25  Meds pended  Patient said he was told to increase the Metformin 500mg to 2 pills twice daily

## 2025-02-12 NOTE — TELEPHONE ENCOUNTER
No care due was identified.  [facility] Embedded Care Due Messages. Reference number: [id number].   2/12/2025 9:44:37 AM CST

## 2025-02-12 NOTE — TELEPHONE ENCOUNTER
----- Message from Ashley sent at 2/12/2025  9:14 AM CST -----  Who Called:        Refill or New Rx: refill         RX Name and Strength:  ezetimibe (ZETIA) 10 mg tablet  metFORMIN (GLUCOPHAGE) 500 MG tablet        Is this a 30 day or 90 day RX        Preferred Pharmacy with phone number:  [address]            Local or Mail Order: local           Would the patient rather a call back or a response via [facility]? Call back         Best Call Back Number:        Additional Information:

## 2025-02-22 DIAGNOSIS — Z00.00 ENCOUNTER FOR MEDICARE ANNUAL WELLNESS EXAM: ICD-10-CM

## 2025-04-29 ENCOUNTER — TELEPHONE (OUTPATIENT)
Dept: INTERNAL MEDICINE | Facility: CLINIC | Age: 75
End: 2025-04-29
Payer: MEDICARE

## 2025-04-29 DIAGNOSIS — R73.03 PREDIABETES: ICD-10-CM

## 2025-04-29 DIAGNOSIS — F10.11 HISTORY OF ETOH ABUSE: ICD-10-CM

## 2025-04-29 DIAGNOSIS — E78.5 HYPERLIPIDEMIA LDL GOAL <130: Primary | ICD-10-CM

## 2025-04-29 DIAGNOSIS — R79.9 BLOOD CHEMISTRY ABNORMALITY: ICD-10-CM

## 2025-04-29 DIAGNOSIS — R73.02 IMPAIRED GLUCOSE TOLERANCE: ICD-10-CM

## 2025-04-29 NOTE — TELEPHONE ENCOUNTER
Left voicemail stating message below:    I hope this message finds you well.  We would like to inform you that Dr. Vasquez will be out of the office from June 18, 2025, through June 30, 2025. As a result, we will need to reschedule 6/26/2025s upcoming appointment currently scheduled with Dr. Vasquez at 3:40pm.  We can offer an alternative appointment during that time with Nurse Practitioner Kiara Francois, who will be available to assist in Dr. Aguilar absence.   Appointment cancelled for now.

## 2025-06-09 ENCOUNTER — TELEPHONE (OUTPATIENT)
Dept: INTERNAL MEDICINE | Facility: CLINIC | Age: 75
End: 2025-06-09
Payer: MEDICARE

## 2025-06-09 DIAGNOSIS — Z12.5 ENCOUNTER FOR SCREENING PROSTATE SPECIFIC ANTIGEN (PSA) MEASUREMENT: Primary | ICD-10-CM

## 2025-06-09 DIAGNOSIS — E78.5 HYPERLIPIDEMIA LDL GOAL <130: ICD-10-CM

## 2025-06-09 DIAGNOSIS — R73.03 PREDIABETES: ICD-10-CM

## 2025-06-09 NOTE — TELEPHONE ENCOUNTER
Spoke with patient and rescheduled appointment from 6/12/2025 to the following below:    Appointment Information   Name: JÚNIOR COLLIER MRN: [id number]   Date: 8/14/2025 Status: Otto   Appt Time: 11:00 AM Length: 40   Visit Type: EP - PRIMARY CARE ([facility]) [486] Copay: $0.00   Provider: Foreign Vasquez MD Dept: [facility], Internal Medicine       Dept Address: [address] [place] [id number]       Dept Phone Number: [phone]   Referring Provider:   DAHLIA: [id number]   Appt Phone:     Notes: annual   Made On: 6/9/2025 4:40 PM By: ESA STEVENS [[id number]] (VELMA)     Added PSA and microalbumin to tomorrow's lab appointment

## 2025-06-10 ENCOUNTER — LAB VISIT (OUTPATIENT)
Dept: LAB | Facility: OTHER | Age: 75
End: 2025-06-10
Attending: STUDENT IN AN ORGANIZED HEALTH CARE EDUCATION/TRAINING PROGRAM
Payer: MEDICARE

## 2025-06-10 ENCOUNTER — RESULTS FOLLOW-UP (OUTPATIENT)
Dept: INTERNAL MEDICINE | Facility: CLINIC | Age: 75
End: 2025-06-10

## 2025-06-10 DIAGNOSIS — R73.03 PREDIABETES: ICD-10-CM

## 2025-06-10 DIAGNOSIS — F10.11 HISTORY OF ETOH ABUSE: ICD-10-CM

## 2025-06-10 DIAGNOSIS — Z12.5 ENCOUNTER FOR SCREENING PROSTATE SPECIFIC ANTIGEN (PSA) MEASUREMENT: ICD-10-CM

## 2025-06-10 DIAGNOSIS — R73.02 IMPAIRED GLUCOSE TOLERANCE: ICD-10-CM

## 2025-06-10 DIAGNOSIS — R79.9 BLOOD CHEMISTRY ABNORMALITY: ICD-10-CM

## 2025-06-10 DIAGNOSIS — E78.5 HYPERLIPIDEMIA LDL GOAL <130: ICD-10-CM

## 2025-06-10 LAB
ABSOLUTE EOSINOPHIL (OHS): 0.24 K/UL
ABSOLUTE MONOCYTE (OHS): 0.56 K/UL (ref 0.3–1)
ABSOLUTE NEUTROPHIL COUNT (OHS): 3.08 K/UL (ref 1.8–7.7)
ALBUMIN SERPL BCP-MCNC: 4.1 G/DL (ref 3.5–5.2)
ALBUMIN/CREAT UR: 6.3 UG/MG
ALP SERPL-CCNC: 44 UNIT/L (ref 40–150)
ALT SERPL W/O P-5'-P-CCNC: 26 UNIT/L (ref 10–44)
ANION GAP (OHS): 10 MMOL/L (ref 8–16)
AST SERPL-CCNC: 28 UNIT/L (ref 11–45)
BASOPHILS # BLD AUTO: 0.03 K/UL
BASOPHILS NFR BLD AUTO: 0.5 %
BILIRUB SERPL-MCNC: 0.7 MG/DL (ref 0.1–1)
BUN SERPL-MCNC: 16 MG/DL (ref 8–23)
CALCIUM SERPL-MCNC: 9.5 MG/DL (ref 8.7–10.5)
CHLORIDE SERPL-SCNC: 108 MMOL/L (ref 95–110)
CHOLEST SERPL-MCNC: 227 MG/DL (ref 120–199)
CHOLEST/HDLC SERPL: 4.5 {RATIO} (ref 2–5)
CO2 SERPL-SCNC: 24 MMOL/L (ref 23–29)
CREAT SERPL-MCNC: 0.9 MG/DL (ref 0.5–1.4)
CREAT UR-MCNC: 79 MG/DL (ref 23–375)
EAG (OHS): 105 MG/DL (ref 68–131)
ERYTHROCYTE [DISTWIDTH] IN BLOOD BY AUTOMATED COUNT: 13.1 % (ref 11.5–14.5)
GFR SERPLBLD CREATININE-BSD FMLA CKD-EPI: >60 ML/MIN/1.73/M2
GLUCOSE SERPL-MCNC: 96 MG/DL (ref 70–110)
HBA1C MFR BLD: 5.3 % (ref 4–5.6)
HCT VFR BLD AUTO: 40 % (ref 40–54)
HDLC SERPL-MCNC: 51 MG/DL (ref 40–75)
HDLC SERPL: 22.5 % (ref 20–50)
HGB BLD-MCNC: 13.9 GM/DL (ref 14–18)
IMM GRANULOCYTES # BLD AUTO: 0.02 K/UL (ref 0–0.04)
IMM GRANULOCYTES NFR BLD AUTO: 0.3 % (ref 0–0.5)
LDLC SERPL CALC-MCNC: 159 MG/DL (ref 63–159)
LYMPHOCYTES # BLD AUTO: 2.09 K/UL (ref 1–4.8)
MCH RBC QN AUTO: 31.8 PG (ref 27–31)
MCHC RBC AUTO-ENTMCNC: 34.8 G/DL (ref 32–36)
MCV RBC AUTO: 92 FL (ref 82–98)
MICROALBUMIN UR-MCNC: 5 UG/ML (ref ?–5000)
NONHDLC SERPL-MCNC: 176 MG/DL
NUCLEATED RBC (/100WBC) (OHS): 0 /100 WBC
PLATELET # BLD AUTO: 171 K/UL (ref 150–450)
PMV BLD AUTO: 10.7 FL (ref 9.2–12.9)
POTASSIUM SERPL-SCNC: 4.5 MMOL/L (ref 3.5–5.1)
PROT SERPL-MCNC: 7.3 GM/DL (ref 6–8.4)
PSA SERPL-MCNC: 0.85 NG/ML
RBC # BLD AUTO: 4.37 M/UL (ref 4.6–6.2)
RELATIVE EOSINOPHIL (OHS): 4 %
RELATIVE LYMPHOCYTE (OHS): 34.7 % (ref 18–48)
RELATIVE MONOCYTE (OHS): 9.3 % (ref 4–15)
RELATIVE NEUTROPHIL (OHS): 51.2 % (ref 38–73)
SODIUM SERPL-SCNC: 142 MMOL/L (ref 136–145)
TRIGL SERPL-MCNC: 85 MG/DL (ref 30–150)
TSH SERPL-ACNC: 1.7 UIU/ML (ref 0.4–4)
WBC # BLD AUTO: 6.02 K/UL (ref 3.9–12.7)

## 2025-06-10 PROCEDURE — 82310 ASSAY OF CALCIUM: CPT

## 2025-06-10 PROCEDURE — 36415 COLL VENOUS BLD VENIPUNCTURE: CPT

## 2025-06-10 PROCEDURE — 80061 LIPID PANEL: CPT

## 2025-06-10 PROCEDURE — 83036 HEMOGLOBIN GLYCOSYLATED A1C: CPT

## 2025-06-10 PROCEDURE — 84153 ASSAY OF PSA TOTAL: CPT

## 2025-06-10 PROCEDURE — 85025 COMPLETE CBC W/AUTO DIFF WBC: CPT

## 2025-06-10 PROCEDURE — 82570 ASSAY OF URINE CREATININE: CPT

## 2025-06-10 PROCEDURE — 84443 ASSAY THYROID STIM HORMONE: CPT

## 2025-07-14 ENCOUNTER — TELEPHONE (OUTPATIENT)
Dept: INTERNAL MEDICINE | Facility: CLINIC | Age: 75
End: 2025-07-14
Payer: MEDICARE

## 2025-07-14 NOTE — TELEPHONE ENCOUNTER
"Faxed "Statin Recommendation for your Patient" forms(checked "other" boxes on page 2 and commented "patient refused statins, per Dr. Vasquez" with my signature dated 7/14/2025 ) to [facility] fax# below/office# [phone]. I have attached a confirmation faxed message below and placed fax in my file cabinet. Fax will be sent to scan after 3 months!    Your fax has been successfully sent to [phone] at [phone].  ------------------------------------------------------------  From: [id number]  ------------------------------------------------------------  7/14/2025 2:40:25 PM Transmission Record          Sent to [phone] with remote ID "[id number]"          Result: (0/339;0/0) Success          Page record: 1 - 4          Elapsed time: 01:29 on channel 51    "

## 2025-07-15 DIAGNOSIS — R73.03 PREDIABETES: ICD-10-CM

## 2025-07-15 RX ORDER — METFORMIN HYDROCHLORIDE 500 MG/1
500 TABLET ORAL 2 TIMES DAILY WITH MEALS
Qty: 120 TABLET | Refills: 3 | Status: SHIPPED | OUTPATIENT
Start: 2025-07-15 | End: 2026-07-15

## 2025-07-15 NOTE — TELEPHONE ENCOUNTER
No care due was identified.  [facility] Embedded Care Due Messages. Reference number: [id number].   7/15/2025 11:27:25 AM CDT

## 2025-07-15 NOTE — TELEPHONE ENCOUNTER
Refill Encounter    PCP Visits: Recent Visits  No visits were found meeting these conditions.  Showing recent visits within past 360 days and meeting all other requirements  Future Appointments  Date Type Provider Dept   08/14/25 Appointment Bank, Foreign Rose MD [facility] Internal Medicine   Showing future appointments within next 720 days and meeting all other requirements      Last 3 Blood Pressure:   BP Readings from Last 3 Encounters:   12/15/24 126/74   04/11/24 136/81   03/25/24 (!) 195/90     Preferred Pharmacy:   [facility] - [place] - [address]  [facility]  [place] [address]  Phone: [phone] Fax: [phone]    Requested RX:  Requested Prescriptions     Pending Prescriptions Disp Refills    metFORMIN (GLUCOPHAGE) 500 MG tablet 120 tablet 3     Sig: Take 1 tablet (500 mg total) by mouth 2 (two) times daily with meals.      RX Route: Normal  Allergies confirmed

## 2025-07-15 NOTE — TELEPHONE ENCOUNTER
Copied from CRM #[id number]. Topic: Medications - Medication Refill  >> Jul 15, 2025 11:16 AM Ifeoma wrote:  Type: RX REFILL REQUEST    Who Called:Patient    Refill or New Rx: Refill    Rx Name and Strength:metFORMIN (GLUCOPHAGE) 500 MG tablet    Pt is currently in [facility] & left his medication and has been without it for about 2 weeks now and needs a fill sent over to the location he is currently in.     Would the patient rather a call back or a response via [facility]? Call    Best Call Back Number: [phone]      Additional Information:    [facility] - [place], [place] - [address]  [address]  [place] [address]  Phone: [phone] Fax: [phone]

## 2025-08-14 ENCOUNTER — OFFICE VISIT (OUTPATIENT)
Dept: INTERNAL MEDICINE | Facility: CLINIC | Age: 75
End: 2025-08-14
Payer: MEDICARE

## 2025-08-14 VITALS
HEIGHT: 68 IN | HEART RATE: 54 BPM | OXYGEN SATURATION: 95 % | WEIGHT: 201.25 LBS | BODY MASS INDEX: 30.5 KG/M2 | SYSTOLIC BLOOD PRESSURE: 124 MMHG | DIASTOLIC BLOOD PRESSURE: 60 MMHG

## 2025-08-14 DIAGNOSIS — E66.3 OVERWEIGHT: ICD-10-CM

## 2025-08-14 DIAGNOSIS — E78.5 HYPERLIPIDEMIA LDL GOAL <130: ICD-10-CM

## 2025-08-14 DIAGNOSIS — T46.6X5A STATIN MYOPATHY: ICD-10-CM

## 2025-08-14 DIAGNOSIS — G72.0 STATIN MYOPATHY: ICD-10-CM

## 2025-08-14 DIAGNOSIS — R73.03 PREDIABETES: ICD-10-CM

## 2025-08-14 DIAGNOSIS — N48.6 PEYRONIE DISEASE: Primary | ICD-10-CM

## 2025-08-14 DIAGNOSIS — Z87.898 HISTORY OF ALCOHOL USE: ICD-10-CM

## 2025-08-14 PROCEDURE — 99999 PR PBB SHADOW E&M-EST. PATIENT-LVL III: CPT | Mod: PBBFAC,,, | Performed by: STUDENT IN AN ORGANIZED HEALTH CARE EDUCATION/TRAINING PROGRAM

## 2025-08-14 PROCEDURE — 99214 OFFICE O/P EST MOD 30 MIN: CPT | Mod: S$PBB,,, | Performed by: STUDENT IN AN ORGANIZED HEALTH CARE EDUCATION/TRAINING PROGRAM

## 2025-08-14 PROCEDURE — 99213 OFFICE O/P EST LOW 20 MIN: CPT | Mod: PBBFAC | Performed by: STUDENT IN AN ORGANIZED HEALTH CARE EDUCATION/TRAINING PROGRAM
